# Patient Record
Sex: MALE | Race: BLACK OR AFRICAN AMERICAN | NOT HISPANIC OR LATINO | Employment: FULL TIME | URBAN - METROPOLITAN AREA
[De-identification: names, ages, dates, MRNs, and addresses within clinical notes are randomized per-mention and may not be internally consistent; named-entity substitution may affect disease eponyms.]

---

## 2017-11-20 DIAGNOSIS — G47.33 OBSTRUCTIVE SLEEP APNEA SYNDROME: Primary | ICD-10-CM

## 2017-11-28 ENCOUNTER — TELEPHONE (OUTPATIENT)
Dept: SLEEP MEDICINE | Facility: OTHER | Age: 37
End: 2017-11-28

## 2017-12-04 ENCOUNTER — TELEPHONE (OUTPATIENT)
Dept: SLEEP MEDICINE | Facility: OTHER | Age: 37
End: 2017-12-04

## 2023-11-22 ENCOUNTER — OFFICE VISIT (OUTPATIENT)
Dept: OPTOMETRY | Facility: CLINIC | Age: 43
End: 2023-11-22
Payer: COMMERCIAL

## 2023-11-22 DIAGNOSIS — H01.006 BLEPHARITIS OF EYELIDS OF BOTH EYES DUE TO STAPHYLOCOCCUS: ICD-10-CM

## 2023-11-22 DIAGNOSIS — H01.003 BLEPHARITIS OF EYELIDS OF BOTH EYES DUE TO STAPHYLOCOCCUS: ICD-10-CM

## 2023-11-22 DIAGNOSIS — H52.13 MYOPIA OF BOTH EYES: Primary | ICD-10-CM

## 2023-11-22 DIAGNOSIS — B95.8 BLEPHARITIS OF EYELIDS OF BOTH EYES DUE TO STAPHYLOCOCCUS: ICD-10-CM

## 2023-11-22 PROCEDURE — 92004 PR EYE EXAM, NEW PATIENT,COMPREHESV: ICD-10-PCS | Mod: S$GLB,,, | Performed by: OPTOMETRIST

## 2023-11-22 PROCEDURE — 99999 PR PBB SHADOW E&M-EST. PATIENT-LVL III: ICD-10-PCS | Mod: PBBFAC,,, | Performed by: OPTOMETRIST

## 2023-11-22 PROCEDURE — 99999 PR PBB SHADOW E&M-EST. PATIENT-LVL III: CPT | Mod: PBBFAC,,, | Performed by: OPTOMETRIST

## 2023-11-22 PROCEDURE — 92015 DETERMINE REFRACTIVE STATE: CPT | Mod: S$GLB,,, | Performed by: OPTOMETRIST

## 2023-11-22 PROCEDURE — 92015 PR REFRACTION: ICD-10-PCS | Mod: S$GLB,,, | Performed by: OPTOMETRIST

## 2023-11-22 PROCEDURE — 92004 COMPRE OPH EXAM NEW PT 1/>: CPT | Mod: S$GLB,,, | Performed by: OPTOMETRIST

## 2023-11-22 RX ORDER — ERYTHROMYCIN 5 MG/G
OINTMENT OPHTHALMIC
Qty: 3.5 G | Refills: 4 | Status: SHIPPED | OUTPATIENT
Start: 2023-11-22 | End: 2023-12-13 | Stop reason: ALTCHOICE

## 2023-11-22 RX ORDER — NEOMYCIN SULFATE, POLYMYXIN B SULFATE AND DEXAMETHASONE 3.5; 10000; 1 MG/ML; [USP'U]/ML; MG/ML
1 SUSPENSION/ DROPS OPHTHALMIC 4 TIMES DAILY
Qty: 5 ML | Refills: 0 | Status: SHIPPED | OUTPATIENT
Start: 2023-11-22 | End: 2023-11-29

## 2023-11-22 NOTE — PATIENT INSTRUCTIONS
Avenova Lid and Lash Solution             [facility]el[facility]

## 2023-11-22 NOTE — PROGRESS NOTES
HPI    Toni Mckeon is a 42 y.o. male who comes in to establish eye care. Toni   reports that he has difficulty seeing when driving at night due to   oncoming headlights and haloes around street lights. He explains that his   near vision is fine. He adds that he has been diagnosed with astigmatism   for which  glasses are prescribed. However, he consistently loses the   glasses. He also expresses that, when he wakes up in the morning,  he uses   his phone for 20 minutes during which his eyes water and burn. He relays   that he was advised to use artifical tears but has not done so.     (+)blurred vision  (--)Headaches  (--)diplopia  (--)flashes  (--)floaters  (--)pain  (--)Itching  (+)tearing  (+)burning  (+)Dryness  (--) OTC Drops  (--)Photophobia      Last edited by Maris Cano, OD on 11/22/2023 11:56 AM.      Review of Systems   Constitutional:  Negative for chills, fever and malaise/fatigue.   HENT:  Negative for congestion.    Eyes:  Positive for blurred vision, photophobia, pain (burning), discharge (epiphora) and redness. Negative for double vision.   Respiratory:  Negative for cough.    Gastrointestinal:  Negative for nausea and vomiting.   Neurological:  Negative for seizures.     For exam results, see encounter report    Assessment /Plan    1. Mild, bilateral Myopic Astigmatism  - Spec Rx per final Rx below for distance only   Glasses Prescription (11/22/2023)          Sphere Cylinder Axis Dist [facility]    Right -0.50 +0.25 095 20/20    Left -0.50 +0.25 095 20/20      Type: SVL    Expiration Date: 11/22/2024              2. 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 for 7 days  Dispense: 5 mL; Refill: 0  -  erythromycin (ROMYCIN) ophthalmic  ointment; Apply ointment to base of top and bottom  eyelashes,of both eyes, at bedtime for 1 week each month  Dispense: 3.5 g; Refill: 4  - When not using erythromycin ointment at bedtime, advised use of an OTC lubricating ointment to protect eyes overnight with use of C-PAP    3. 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

## 2023-12-13 ENCOUNTER — OFFICE VISIT (OUTPATIENT)
Dept: OPTOMETRY | Facility: CLINIC | Age: 43
End: 2023-12-13
Payer: COMMERCIAL

## 2023-12-13 DIAGNOSIS — H04.123 BILATERAL DRY EYES: Primary | ICD-10-CM

## 2023-12-13 DIAGNOSIS — H52.13 MYOPIA OF BOTH EYES: ICD-10-CM

## 2023-12-13 PROCEDURE — 92015 PR REFRACTION: ICD-10-PCS | Mod: S$GLB,,, | Performed by: OPTOMETRIST

## 2023-12-13 PROCEDURE — 99499 UNLISTED E&M SERVICE: CPT | Mod: S$GLB,,, | Performed by: OPTOMETRIST

## 2023-12-13 PROCEDURE — 92015 DETERMINE REFRACTIVE STATE: CPT | Mod: S$GLB,,, | Performed by: OPTOMETRIST

## 2023-12-13 PROCEDURE — 99499 NO LOS: ICD-10-PCS | Mod: S$GLB,,, | Performed by: OPTOMETRIST

## 2023-12-13 PROCEDURE — 99999 PR PBB SHADOW E&M-EST. PATIENT-LVL II: CPT | Mod: PBBFAC,,, | Performed by: OPTOMETRIST

## 2023-12-13 PROCEDURE — 99999 PR PBB SHADOW E&M-EST. PATIENT-LVL II: ICD-10-PCS | Mod: PBBFAC,,, | Performed by: OPTOMETRIST

## 2023-12-13 NOTE — PROGRESS NOTES
HPI    Toni Mckeon is a 42 y.o. male who returns for DFE. . Toni's initial   visit with me was on 1/22/2023. At that time he was noted to have   bilateral myopia and blepharitis. DFE was deferred secondary to work   obligations. Glasses were prescribed for myopia. Maxitrol eye drops,   hypochlorous eyelid cleanser, and erythromycin ointment was prescribed for   blepharitis. Today, he reports that he has been using artificial tears   throughout the day with relief of burning symptoms. He adds that he has   not used the erythromycin ointment or filled the glasses prescription yet.       (--)blurred vision  (--)Headaches  (--)diplopia  (--)flashes  (--)floaters  (--)pain  (--)Itching  (--)tearing  (--)burning  (--)Dryness  (--) OTC Drops  (--)Photophobia      Last edited by Mrais Cano, OD on 12/13/2023 10:39 AM.        For exam results, see encounter report    Assessment /Plan    Dry eyes --> improved with regular use of artificial tears  - Continue artificial tears daily; advise Systane overnight gel at bedtime    2. Mild bilateral myopia  - Spec Rx per final Rx below for distance (night driving) only  Glasses Prescription (12/13/2023)          Sphere Cylinder Axis    Right -0.50 +0.25 095    Left -0.50 +0.25 095      Type: SVL    Expiration Date: 12/13/2024          3.  Retinal/ Ocular health intact  - No ocular  treatment needed    - IOP high today, however, last exam IOPs were WNL, Optic nerves are healthy)    Patient education; RTC 1 year, sooner as needed